# Patient Record
Sex: FEMALE | Race: BLACK OR AFRICAN AMERICAN | NOT HISPANIC OR LATINO | URBAN - METROPOLITAN AREA
[De-identification: names, ages, dates, MRNs, and addresses within clinical notes are randomized per-mention and may not be internally consistent; named-entity substitution may affect disease eponyms.]

---

## 2017-06-26 ENCOUNTER — EMERGENCY (EMERGENCY)
Facility: HOSPITAL | Age: 37
LOS: 1 days | Discharge: HOME | End: 2017-06-26
Admitting: EMERGENCY MEDICINE
Payer: MEDICAID

## 2017-06-26 VITALS
SYSTOLIC BLOOD PRESSURE: 94 MMHG | HEART RATE: 64 BPM | OXYGEN SATURATION: 100 % | WEIGHT: 220.46 LBS | DIASTOLIC BLOOD PRESSURE: 61 MMHG | HEIGHT: 69 IN | TEMPERATURE: 98 F | RESPIRATION RATE: 18 BRPM

## 2017-06-26 DIAGNOSIS — M79.89 OTHER SPECIFIED SOFT TISSUE DISORDERS: ICD-10-CM

## 2017-06-26 DIAGNOSIS — M25.571 PAIN IN RIGHT ANKLE AND JOINTS OF RIGHT FOOT: ICD-10-CM

## 2017-06-26 PROCEDURE — 73610 X-RAY EXAM OF ANKLE: CPT

## 2017-06-26 PROCEDURE — 93971 EXTREMITY STUDY: CPT

## 2017-06-26 PROCEDURE — 93971 EXTREMITY STUDY: CPT | Mod: 26,RT

## 2017-06-26 PROCEDURE — 73610 X-RAY EXAM OF ANKLE: CPT | Mod: 26,RT

## 2017-06-26 PROCEDURE — 73630 X-RAY EXAM OF FOOT: CPT

## 2017-06-26 PROCEDURE — 99284 EMERGENCY DEPT VISIT MOD MDM: CPT | Mod: 25

## 2017-06-26 PROCEDURE — 73630 X-RAY EXAM OF FOOT: CPT | Mod: 26,RT

## 2017-06-26 NOTE — ED PROVIDER NOTE - PHYSICAL EXAMINATION
CONSTITUTIONAL: WD,WN. NAD.    SKIN: Normal color and turgor. No rash or bruising.    HEAD: NC/AT.  EYES: Conjunctiva clear. EOMI. PERRL.    ENT: Airway patent, OP clear. Nasal mucosa clear, no rhinorrhea.   RESPIRATORY:  Breathing non-labored. No retractions, accessory muscle use.  Lungs CTA bilaterally.  CARDIOVASCULAR:  RRR, S1S2. No M/R/G.      GI:   Bowel sounds present. Abdomen soft, nontender.    MSK: TTP right lateral malleolus.  Mild lateral ankle swelling.  No TTP of midfoot, 5th metatarsal, calcaneus; reports moderate pain in lateral right foot when bearing weight.  Strong DP and PT pulses.  Cap refill < 2 sec.    NEURO: Alert and oriented; NGUYEN without gross abnormalities.  Normal speech and gait; walks without a limp.

## 2017-06-26 NOTE — ED PROVIDER NOTE - OBJECTIVE STATEMENT
pt with hx of peripheral neuropathy and balance problems, lost balance and fell down apx 3 stairs four days ago, twisting her right ankle.  She did not hit head.  She initially had pain in the lateral right ankle; she has been walking around on it since this occurred and the ankle pain has resolved; she now reports swelling in the right ankle and lower calf that developed in the past 2 days.  She also has some mild pain in the lateral right foot when walking.    No hx of dvt/pe.  No hx of malignancy.   No cp/palpitations or dyspnea.  No hemoptysis.

## 2017-06-26 NOTE — ED PROVIDER NOTE - MEDICAL DECISION MAKING DETAILS
Hx of trauma to right lower leg/ankle 4 days ago; very TTP lateral malleolus; mild calf swelling & tenderness.  Pt walking without a limp; suspicion low for Fx.  No headache or reported head injury.  Will US and XR RLE. Hx of trauma to right lower leg/ankle 4 days ago; very TTP lateral malleolus; mild calf swelling & tenderness.  Pt walking without a limp; suspicion low for Fx.  No headache or reported head injury; no other injuries appreciated.  Will US and XR RLE.

## 2017-06-26 NOTE — ED PROVIDER NOTE - SHIFT CHANGE DETAILS
Having lower leg swelling after fall few days ago;  XR negative for fx;  awaiting US results. If no DVT, plan to DC home.

## 2017-06-26 NOTE — ED PROVIDER NOTE - NS ED ROS FT
CONSTITUTIONAL: No fever, chills, or weakness  NEURO: No headache, no dizziness, no syncope; No weakness/tingling/numbness  EYES: No visual changes  ENT: No rhinorrhea or sore throat  PULM: No cough or dyspnea  CV: No chest pain or palpitations  GI: No abdominal pain, vomiting, or diarrhea  : No dysuria, hematuria, frequency  MSK: No neck pain or back pain  SKIN: no rash

## 2017-06-27 NOTE — ED PROCEDURE NOTE - CPROC ED POST PROC CARE GUIDE1
Verbal/written post procedure instructions were given to patient/caregiver./Elevate the injured extremity as instructed./Instructed patient/caregiver regarding signs and symptoms of infection./Primary Care/Orthopedic follow up